# Patient Record
Sex: FEMALE | URBAN - METROPOLITAN AREA
[De-identification: names, ages, dates, MRNs, and addresses within clinical notes are randomized per-mention and may not be internally consistent; named-entity substitution may affect disease eponyms.]

---

## 2024-09-20 ENCOUNTER — HOSPITAL ENCOUNTER (OUTPATIENT)
Dept: LAB | Age: 54
Setting detail: SPECIMEN
Discharge: HOME | End: 2024-09-23

## 2024-09-20 PROCEDURE — 86787 VARICELLA-ZOSTER ANTIBODY: CPT

## 2024-09-20 PROCEDURE — 86765 RUBEOLA ANTIBODY: CPT

## 2024-09-20 PROCEDURE — 86762 RUBELLA ANTIBODY: CPT

## 2024-09-20 PROCEDURE — 36415 COLL VENOUS BLD VENIPUNCTURE: CPT

## 2024-09-20 PROCEDURE — 86735 MUMPS ANTIBODY: CPT

## 2024-11-04 ENCOUNTER — OFFICE VISIT (OUTPATIENT)
Dept: FAMILY MEDICINE CLINIC | Facility: CLINIC | Age: 54
End: 2024-11-04

## 2024-11-04 VITALS
BODY MASS INDEX: 38.04 KG/M2 | TEMPERATURE: 98 F | HEART RATE: 100 BPM | DIASTOLIC BLOOD PRESSURE: 100 MMHG | HEIGHT: 64 IN | SYSTOLIC BLOOD PRESSURE: 140 MMHG | OXYGEN SATURATION: 97 % | WEIGHT: 222.8 LBS

## 2024-11-04 DIAGNOSIS — Z13.6 SCREENING FOR HEART DISEASE: ICD-10-CM

## 2024-11-04 DIAGNOSIS — Z12.39 ENCOUNTER FOR SCREENING BREAST EXAMINATION AND DISCUSSION OF BREAST SELF EXAMINATION: ICD-10-CM

## 2024-11-04 DIAGNOSIS — Z13.29 SCREENING FOR ENDOCRINE DISORDER: Primary | ICD-10-CM

## 2024-11-04 DIAGNOSIS — E11.9 TYPE 2 DIABETES MELLITUS WITHOUT COMPLICATION, WITHOUT LONG-TERM CURRENT USE OF INSULIN (HCC): ICD-10-CM

## 2024-11-04 DIAGNOSIS — Z80.3 FAMILY HISTORY OF BREAST CANCER IN FEMALE: ICD-10-CM

## 2024-11-04 DIAGNOSIS — E78.2 MIXED HYPERLIPIDEMIA: ICD-10-CM

## 2024-11-04 DIAGNOSIS — I10 PRIMARY HYPERTENSION: ICD-10-CM

## 2024-11-04 LAB
ALBUMIN SERPL-MCNC: 3.7 G/DL (ref 3.5–5)
ALBUMIN, URINE, POC: 150 MG/L
ALBUMIN/GLOB SERPL: 0.9 (ref 1–1.9)
ALP SERPL-CCNC: 158 U/L (ref 35–104)
ALT SERPL-CCNC: 24 U/L (ref 8–45)
ANION GAP SERPL CALC-SCNC: 8 MMOL/L (ref 7–16)
AST SERPL-CCNC: 30 U/L (ref 15–37)
BASOPHILS # BLD: 0 K/UL (ref 0–0.2)
BASOPHILS NFR BLD: 1 % (ref 0–2)
BILIRUB SERPL-MCNC: 0.4 MG/DL (ref 0–1.2)
BILIRUBIN, URINE, POC: ABNORMAL
BLOOD URINE, POC: NEGATIVE
BUN SERPL-MCNC: 16 MG/DL (ref 6–23)
CALCIUM SERPL-MCNC: 9.8 MG/DL (ref 8.8–10.2)
CHLORIDE SERPL-SCNC: 105 MMOL/L (ref 98–107)
CHOLEST SERPL-MCNC: 221 MG/DL (ref 0–200)
CO2 SERPL-SCNC: 30 MMOL/L (ref 20–29)
CREAT SERPL-MCNC: 0.84 MG/DL (ref 0.6–1.1)
CREATININE, URINE, POC: 300 MG/DL
DIFFERENTIAL METHOD BLD: ABNORMAL
EOSINOPHIL # BLD: 0.1 K/UL (ref 0–0.8)
EOSINOPHIL NFR BLD: 2 % (ref 0.5–7.8)
ERYTHROCYTE [DISTWIDTH] IN BLOOD BY AUTOMATED COUNT: 15 % (ref 11.9–14.6)
EST. AVERAGE GLUCOSE BLD GHB EST-MCNC: 149 MG/DL
GLOBULIN SER CALC-MCNC: 4.3 G/DL (ref 2.3–3.5)
GLUCOSE SERPL-MCNC: 91 MG/DL (ref 70–99)
GLUCOSE URINE, POC: ABNORMAL
HBA1C MFR BLD: 6.8 % (ref 0–5.6)
HCT VFR BLD AUTO: 45.7 % (ref 35.8–46.3)
HDLC SERPL-MCNC: 96 MG/DL (ref 40–60)
HDLC SERPL: 2.3 (ref 0–5)
HGB BLD-MCNC: 14.3 G/DL (ref 11.7–15.4)
IMM GRANULOCYTES # BLD AUTO: 0 K/UL (ref 0–0.5)
IMM GRANULOCYTES NFR BLD AUTO: 0 % (ref 0–5)
KETONES, URINE, POC: ABNORMAL
LDLC SERPL CALC-MCNC: 107 MG/DL (ref 0–100)
LEUKOCYTE ESTERASE, URINE, POC: ABNORMAL
LYMPHOCYTES # BLD: 2.2 K/UL (ref 0.5–4.6)
LYMPHOCYTES NFR BLD: 40 % (ref 13–44)
MCH RBC QN AUTO: 25.9 PG (ref 26.1–32.9)
MCHC RBC AUTO-ENTMCNC: 31.3 G/DL (ref 31.4–35)
MCV RBC AUTO: 82.6 FL (ref 82–102)
MICROALB/CREAT RATIO, POC: ABNORMAL MG/G
MONOCYTES # BLD: 0.6 K/UL (ref 0.1–1.3)
MONOCYTES NFR BLD: 11 % (ref 4–12)
NEUTS SEG # BLD: 2.6 K/UL (ref 1.7–8.2)
NEUTS SEG NFR BLD: 46 % (ref 43–78)
NITRITE, URINE, POC: NEGATIVE
NRBC # BLD: 0 K/UL (ref 0–0.2)
PH, URINE, POC: 6.5 (ref 4.6–8)
PLATELET # BLD AUTO: 238 K/UL (ref 150–450)
PMV BLD AUTO: 11.6 FL (ref 9.4–12.3)
POTASSIUM SERPL-SCNC: 4.8 MMOL/L (ref 3.5–5.1)
PROT SERPL-MCNC: 8 G/DL (ref 6.3–8.2)
PROTEIN,URINE, POC: 100
RBC # BLD AUTO: 5.53 M/UL (ref 4.05–5.2)
SODIUM SERPL-SCNC: 143 MMOL/L (ref 136–145)
SPECIFIC GRAVITY, URINE, POC: 1.02 (ref 1–1.03)
TRIGL SERPL-MCNC: 91 MG/DL (ref 0–150)
TSH W FREE THYROID IF ABNORMAL: 1.9 UIU/ML (ref 0.27–4.2)
URINALYSIS CLARITY, POC: CLEAR
URINALYSIS COLOR, POC: YELLOW
UROBILINOGEN, POC: NORMAL
VLDLC SERPL CALC-MCNC: 18 MG/DL (ref 6–23)
WBC # BLD AUTO: 5.5 K/UL (ref 4.3–11.1)

## 2024-11-04 RX ORDER — VALACYCLOVIR HYDROCHLORIDE 500 MG/1
500 TABLET, FILM COATED ORAL 2 TIMES DAILY
COMMUNITY

## 2024-11-04 RX ORDER — LOSARTAN POTASSIUM 25 MG/1
25 TABLET ORAL
Qty: 90 TABLET | Refills: 1 | Status: SHIPPED | OUTPATIENT
Start: 2024-11-04

## 2024-11-04 RX ORDER — MELOXICAM 15 MG/1
15 TABLET ORAL DAILY
COMMUNITY
End: 2024-11-04 | Stop reason: SDUPTHER

## 2024-11-04 RX ORDER — AMLODIPINE BESYLATE 10 MG/1
10 TABLET ORAL DAILY
COMMUNITY
End: 2024-11-04 | Stop reason: SDUPTHER

## 2024-11-04 RX ORDER — ROSUVASTATIN CALCIUM 20 MG/1
20 TABLET, COATED ORAL DAILY
COMMUNITY
End: 2024-11-04 | Stop reason: SDUPTHER

## 2024-11-04 RX ORDER — METFORMIN HYDROCHLORIDE 500 MG/1
500 TABLET, EXTENDED RELEASE ORAL
COMMUNITY
End: 2024-11-04 | Stop reason: SDUPTHER

## 2024-11-04 RX ORDER — PANTOPRAZOLE SODIUM 40 MG/1
40 TABLET, DELAYED RELEASE ORAL DAILY
COMMUNITY
End: 2024-11-04 | Stop reason: SDUPTHER

## 2024-11-04 RX ORDER — PANTOPRAZOLE SODIUM 40 MG/1
40 TABLET, DELAYED RELEASE ORAL DAILY
Qty: 90 TABLET | Refills: 1 | Status: SHIPPED | OUTPATIENT
Start: 2024-11-04

## 2024-11-04 RX ORDER — ROSUVASTATIN CALCIUM 20 MG/1
20 TABLET, COATED ORAL DAILY
Qty: 90 TABLET | Refills: 1 | Status: SHIPPED | OUTPATIENT
Start: 2024-11-04

## 2024-11-04 RX ORDER — METFORMIN HYDROCHLORIDE 500 MG/1
500 TABLET, EXTENDED RELEASE ORAL
Qty: 30 TABLET | Refills: 2 | Status: SHIPPED | OUTPATIENT
Start: 2024-11-04

## 2024-11-04 RX ORDER — MELOXICAM 15 MG/1
15 TABLET ORAL DAILY
Qty: 90 TABLET | Refills: 1 | Status: SHIPPED | OUTPATIENT
Start: 2024-11-04

## 2024-11-04 RX ORDER — AMLODIPINE BESYLATE 10 MG/1
10 TABLET ORAL DAILY
Qty: 90 TABLET | Refills: 1 | Status: SHIPPED | OUTPATIENT
Start: 2024-11-04

## 2024-11-04 SDOH — ECONOMIC STABILITY: FOOD INSECURITY: WITHIN THE PAST 12 MONTHS, YOU WORRIED THAT YOUR FOOD WOULD RUN OUT BEFORE YOU GOT MONEY TO BUY MORE.: NEVER TRUE

## 2024-11-04 SDOH — ECONOMIC STABILITY: FOOD INSECURITY: WITHIN THE PAST 12 MONTHS, THE FOOD YOU BOUGHT JUST DIDN'T LAST AND YOU DIDN'T HAVE MONEY TO GET MORE.: NEVER TRUE

## 2024-11-04 SDOH — ECONOMIC STABILITY: INCOME INSECURITY: HOW HARD IS IT FOR YOU TO PAY FOR THE VERY BASICS LIKE FOOD, HOUSING, MEDICAL CARE, AND HEATING?: NOT VERY HARD

## 2024-11-04 ASSESSMENT — PATIENT HEALTH QUESTIONNAIRE - PHQ9
SUM OF ALL RESPONSES TO PHQ9 QUESTIONS 1 & 2: 0
2. FEELING DOWN, DEPRESSED OR HOPELESS: NOT AT ALL
SUM OF ALL RESPONSES TO PHQ QUESTIONS 1-9: 0
1. LITTLE INTEREST OR PLEASURE IN DOING THINGS: NOT AT ALL
SUM OF ALL RESPONSES TO PHQ QUESTIONS 1-9: 0

## 2024-11-04 NOTE — PROGRESS NOTES
Metabolic Panel    Mixed hyperlipidemia  -     Lipid Panel; Future  -     Lipid Panel    Primary hypertension    Family history of breast cancer in female  Comments:  mother had DCIS around 63    Encounter for screening breast examination and discussion of breast self examination  -     [facility] DIGITAL SCREEN W OR WO CAD BILATERAL; Future    Other orders  -     losartan (COZAAR) 25 MG tablet; Take 1 tablet by mouth nightly  -     rosuvastatin (CRESTOR) 20 MG tablet; Take 1 tablet by mouth daily  -     pantoprazole (PROTONIX) 40 MG tablet; Take 1 tablet by mouth daily  -     amLODIPine (NORVASC) 10 MG tablet; Take 1 tablet by mouth daily  -     meloxicam (MOBIC) 15 MG tablet; Take 1 tablet by mouth daily  -     metFORMIN (GLUCOPHAGE-XR) 500 MG extended release tablet; Take 1 tablet by mouth nightly      Plan includes the following:  Follow up with labs in 6 months if remaining stable   No follow-up provider specified.  Orders Placed This Encounter   Procedures    IAN DIGITAL SCREEN W OR WO CAD BILATERAL     Standing Status:   Future     Standing Expiration Date:   1/4/2026    CBC with Auto Differential     Standing Status:   Future     Number of Occurrences:   1     Standing Expiration Date:   11/4/2025    TSH with Reflex     Standing Status:   Future     Number of Occurrences:   1     Standing Expiration Date:   11/4/2025    Comprehensive Metabolic Panel     Standing Status:   Future     Number of Occurrences:   1     Standing Expiration Date:   11/4/2025    Lipid Panel     Standing Status:   Future     Number of Occurrences:   1     Standing Expiration Date:   11/4/2025    Hemoglobin A1C     Standing Status:   Future     Number of Occurrences:   1     Standing Expiration Date:   11/4/2025    [facility] - [facility]     Referral Priority:   Routine     Referral Type:   Eval and Treat     Referral Reason:   Specialty Services Required     Referral Location:   [facility]

## 2024-12-04 ENCOUNTER — HOSPITAL ENCOUNTER (EMERGENCY)
Age: 54
Discharge: HOME | End: 2024-12-04
Attending: EMERGENCY MEDICINE
Payer: COMMERCIAL

## 2024-12-04 ENCOUNTER — APPOINTMENT (OUTPATIENT)
Dept: GENERAL RADIOLOGY | Age: 54
End: 2024-12-04
Payer: COMMERCIAL

## 2024-12-04 VITALS
OXYGEN SATURATION: 98 % | DIASTOLIC BLOOD PRESSURE: 110 MMHG | BODY MASS INDEX: 37.56 KG/M2 | RESPIRATION RATE: 18 BRPM | WEIGHT: 220 LBS | TEMPERATURE: 97.5 F | SYSTOLIC BLOOD PRESSURE: 148 MMHG | HEIGHT: 64 IN | HEART RATE: 80 BPM

## 2024-12-04 DIAGNOSIS — S92.354A NONDISPLACED FRACTURE OF FIFTH METATARSAL BONE, RIGHT FOOT, INITIAL ENCOUNTER FOR CLOSED FRACTURE: Primary | ICD-10-CM

## 2024-12-04 PROCEDURE — 73630 X-RAY EXAM OF FOOT: CPT

## 2024-12-04 PROCEDURE — 99283 EMERGENCY DEPT VISIT LOW MDM: CPT

## 2024-12-04 RX ORDER — HYDROCODONE BITARTRATE AND ACETAMINOPHEN 5; 325 MG/1; MG/1
1 TABLET ORAL EVERY 6 HOURS PRN
Qty: 12 TABLET | Refills: 0 | Status: SHIPPED | OUTPATIENT
Start: 2024-12-04 | End: 2024-12-07

## 2024-12-04 ASSESSMENT — PAIN SCALES - GENERAL
PAINLEVEL_OUTOF10: 9
PAINLEVEL_OUTOF10: 3

## 2024-12-04 ASSESSMENT — PAIN - FUNCTIONAL ASSESSMENT
PAIN_FUNCTIONAL_ASSESSMENT: 0-10
PAIN_FUNCTIONAL_ASSESSMENT: 0-10

## 2024-12-04 ASSESSMENT — LIFESTYLE VARIABLES
HOW MANY STANDARD DRINKS CONTAINING ALCOHOL DO YOU HAVE ON A TYPICAL DAY: PATIENT DOES NOT DRINK
HOW OFTEN DO YOU HAVE A DRINK CONTAINING ALCOHOL: NEVER

## 2024-12-04 NOTE — DISCHARGE INSTRUCTIONS
Take medication as prescribed.   Wear walking boot.  Follow up with orthopedics. A referral has been placed to assist you with follow up.

## 2024-12-04 NOTE — ED TRIAGE NOTES
Pt ambulatory with c/o right foot pain X3 weeks causing difficulty ambulating. Pt denies any injury and states the XR she had at Urgent Care was negative.

## 2024-12-04 NOTE — ED PROVIDER NOTES
fracture through the  proximal aspect of the fifth metatarsal bone. No other acute findings seen.    Electronically signed by Binh Miramontes        XR FOOT RIGHT (MIN 3 VIEWS)   Final Result   Findings/impression: There is an acute nondisplaced fracture through the   proximal aspect of the fifth metatarsal bone. No other acute findings seen.      Electronically signed by Binh Miramontes                          Voice dictation software was used during the making of this note.  This software is not perfect and grammatical and other typographical errors may be present.  This note has not been completely proofread for errors.     Jalil Villafuerte MD  12/04/24 [id number]

## 2024-12-05 ENCOUNTER — TELEPHONE (OUTPATIENT)
Dept: ORTHOPEDIC SURGERY | Age: 54
End: 2024-12-05

## 2024-12-06 ENCOUNTER — OFFICE VISIT (OUTPATIENT)
Dept: ORTHOPEDIC SURGERY | Age: 54
End: 2024-12-06
Payer: COMMERCIAL

## 2024-12-06 ENCOUNTER — HOSPITAL ENCOUNTER (OUTPATIENT)
Dept: MAMMOGRAPHY | Age: 54
Discharge: HOME | End: 2024-12-09
Payer: COMMERCIAL

## 2024-12-06 VITALS — HEIGHT: 64 IN | WEIGHT: 220 LBS | BODY MASS INDEX: 37.56 KG/M2

## 2024-12-06 DIAGNOSIS — S92.354A CLOSED NONDISPLACED FRACTURE OF FIFTH METATARSAL BONE OF RIGHT FOOT, INITIAL ENCOUNTER: Primary | ICD-10-CM

## 2024-12-06 DIAGNOSIS — Z13.29 SCREENING FOR ENDOCRINE DISORDER: ICD-10-CM

## 2024-12-06 DIAGNOSIS — R52 PAIN: ICD-10-CM

## 2024-12-06 DIAGNOSIS — Z12.39 ENCOUNTER FOR SCREENING BREAST EXAMINATION AND DISCUSSION OF BREAST SELF EXAMINATION: ICD-10-CM

## 2024-12-06 PROCEDURE — 77063 BREAST TOMOSYNTHESIS BI: CPT

## 2024-12-06 PROCEDURE — 99204 OFFICE O/P NEW MOD 45 MIN: CPT | Performed by: PHYSICIAN ASSISTANT

## 2024-12-06 RX ORDER — ERGOCALCIFEROL 1.25 MG/1
50000 CAPSULE, LIQUID FILLED ORAL WEEKLY
Qty: 8 CAPSULE | Refills: 0 | Status: SHIPPED | OUTPATIENT
Start: 2024-12-06

## 2024-12-06 NOTE — PROGRESS NOTES
Name: Eliana Hassan  YOB: 1970  Gender: female  MRN: [id number]    Summary: Zone 2 base 5th Metatarsal fracture:     Surgical and nonsurgical options were discussed in detail with the patient today.  Patient has elected to proceed with nonoperative management    She will stay in a short walking boot.  May WBAT in the boot  Vitamin D 50,000 unit/weekly x 8 weeks  Bone stimulator ordered today.  Due to the high nonunion rates of this fracture, the bone stimulator is imperative in aiding her recovery and prevent surgery.      F/u 4 weeks w/ Foot Xray       CC: lateral foot pain    Eliana Hassan is a 54-year-old female that presents with right lateral foot pain.  Patient states that her pain started approximately 3 weeks ago on the lateral aspect of her foot.  She does not recall any injury or trauma to this foot.  She had gone to an urgent care and x-rays were negative.  She continues to have pain and went to the ED on 12//24 and was diagnosed with 1/5 metatarsal base fracture.  She does not have any pain at rest but has significant pain and difficulty with ambulation.    ROS/Meds/PSH/PMH/FH/SH: below is tobacco and diabetes.  A full history is at the bottom of the note.     Patient Denies fever/chills, headache, visual changes, chest pain, shortness of breath, and nausea/vomiting/diarrhea       Physical Examination:    right Lower Extremity: 2+ DP pulse.  Ecchymosis and edema over the lateral foot. TTP at the 5th MT.  No signs of open injury or laceration.  TTP around the lateral ankle too, however must the pain is over the lateral foot at the 5th metatarsal. ROM ankle is normal, however any foot motion is painful.   Neutral hindfoot alignment.  No cavovarus nor planovalgus foot deformity    Neuro:  normal SILT to s/s/sp/dp/t.  Reflexes normal: 1+ patella reflex bilaterally, 1+ achilles reflex bilaterally, negative babinski bilaterally. no signs of hyper reflexia or absent reflex    Vascular: BLE:

## 2024-12-16 ENCOUNTER — TELEPHONE (OUTPATIENT)
Dept: FAMILY MEDICINE CLINIC | Facility: CLINIC | Age: 54
End: 2024-12-16

## 2024-12-16 NOTE — TELEPHONE ENCOUNTER
Pt request pharmacy to be changed to [facility] pharmacy    [id number] - [place], [place] - [address] [facility] [phone] - F [phone]

## 2024-12-24 ENCOUNTER — TELEPHONE (OUTPATIENT)
Dept: FAMILY MEDICINE CLINIC | Facility: CLINIC | Age: 54
End: 2024-12-24

## 2024-12-24 NOTE — TELEPHONE ENCOUNTER
Patient priscilla requesting refills for the following medications:          amLODIPine (NORVASC) 10 MG     pantoprazole (PROTONIX) 40 MG      metFORMIN (GLUCOPHAGE-XR) 500 MG extended release tablet    rosuvastatin (CRESTOR) 20 MG tablet [[id number]]       [id number] [address] [phone] -  [phone]

## 2024-12-27 RX ORDER — ROSUVASTATIN CALCIUM 20 MG/1
20 TABLET, COATED ORAL DAILY
Qty: 90 TABLET | Refills: 1 | Status: SHIPPED | OUTPATIENT
Start: 2024-12-27

## 2024-12-27 RX ORDER — METFORMIN HYDROCHLORIDE 500 MG/1
500 TABLET, EXTENDED RELEASE ORAL
Qty: 30 TABLET | Refills: 2 | Status: SHIPPED | OUTPATIENT
Start: 2024-12-27

## 2024-12-27 RX ORDER — AMLODIPINE BESYLATE 10 MG/1
10 TABLET ORAL DAILY
Qty: 90 TABLET | Refills: 1 | Status: SHIPPED | OUTPATIENT
Start: 2024-12-27

## 2024-12-27 RX ORDER — PANTOPRAZOLE SODIUM 40 MG/1
40 TABLET, DELAYED RELEASE ORAL DAILY
Qty: 90 TABLET | Refills: 1 | Status: SHIPPED | OUTPATIENT
Start: 2024-12-27

## 2025-01-09 ENCOUNTER — TELEPHONE (OUTPATIENT)
Dept: ORTHOPEDIC SURGERY | Age: 55
End: 2025-01-09

## 2025-01-09 ENCOUNTER — OFFICE VISIT (OUTPATIENT)
Dept: ORTHOPEDIC SURGERY | Age: 55
End: 2025-01-09
Payer: COMMERCIAL

## 2025-01-09 DIAGNOSIS — S92.354A CLOSED NONDISPLACED FRACTURE OF FIFTH METATARSAL BONE OF RIGHT FOOT, INITIAL ENCOUNTER: ICD-10-CM

## 2025-01-09 DIAGNOSIS — R52 PAIN: Primary | ICD-10-CM

## 2025-01-09 PROCEDURE — M5016 MISC CARBON FIBER: HCPCS | Performed by: PHYSICIAN ASSISTANT

## 2025-01-09 PROCEDURE — 99213 OFFICE O/P EST LOW 20 MIN: CPT | Performed by: PHYSICIAN ASSISTANT

## 2025-01-09 NOTE — PROGRESS NOTES
Name: Eliana Hassan  YOB: 1970  Gender: female  MRN: [id number]    Summary: Zone 2 base 5th Metatarsal fracture:     Increase WB in the boot and transition to carbon fiber insert.  Carbon fiber insert given today.  Continue Vitamin D 50,000 unit/weekly   Bone stimulator ordered today.  Due to the high nonunion rates of this fracture, the bone stimulator is imperative in aiding her recovery and prevent surgery.    Work note: 1 hour sitting for every 1 hour standing.      F/u 4-6 weeks w/ Foot Xray       CC: lateral foot pain    Eliana Hassan is a 54-year-old female that presents with right lateral foot pain.  Patient states that her pain started approximately 3 weeks ago on the lateral aspect of her foot.  She does not recall any injury or trauma to this foot.  She had gone to an urgent care and x-rays were negative.  She continues to have pain and went to the ED on 12//24 and was diagnosed with 1/5 metatarsal base fracture.  She does not have any pain at rest but has significant pain and difficulty with ambulation.    1/9/2025-patient states she is doing well.  She has been weightbearing some in the boot.  She is back to work but doing sitting work only.  She states that her pain has improved but does still have some pain.  She states that she did not get a call about the bone stimulator.    ROS/Meds/PSH/PMH/FH/SH: below is tobacco and diabetes.  A full history is at the bottom of the note.     Patient Denies fever/chills, headache, visual changes, chest pain, shortness of breath, and nausea/vomiting/diarrhea       Physical Examination:    right Lower Extremity: 2+ DP pulse.  Ecchymosis and edema over the lateral foot. TTP at the 5th MT.  No signs of open injury or laceration.  TTP around the lateral ankle too, however must the pain is over the lateral foot at the 5th metatarsal. ROM ankle is normal, however any foot motion is painful.   Neutral hindfoot alignment.  No cavovarus nor planovalgus

## 2025-01-09 NOTE — TELEPHONE ENCOUNTER
Eliana Hassan  Female, 54 y.o., 1970  MRN: [id number]  Phone: [phone]  Code: Not on file (no ACP docs)

## 2025-01-09 NOTE — PROGRESS NOTES
Patient was fitted and instructed on a Carbon Fiber Insert for the right foot. Patient read and signed documenting they understand and agree to [facility]'s current DME return policy.

## 2025-01-29 ENCOUNTER — TELEPHONE (OUTPATIENT)
Dept: FAMILY MEDICINE CLINIC | Facility: CLINIC | Age: 55
End: 2025-01-29

## 2025-01-29 RX ORDER — MELOXICAM 15 MG/1
15 TABLET ORAL DAILY
Qty: 90 TABLET | Refills: 1 | Status: SHIPPED | OUTPATIENT
Start: 2025-01-29

## 2025-01-29 NOTE — TELEPHONE ENCOUNTER
Pt called and request a refill for this medication:        meloxicam (MOBIC) 15 MG tablet       [id number] - [place], [place] - [address] [place] [address] -  [phone] -  [phone]

## 2025-02-19 ENCOUNTER — OFFICE VISIT (OUTPATIENT)
Dept: ORTHOPEDIC SURGERY | Age: 55
End: 2025-02-19
Payer: COMMERCIAL

## 2025-02-19 DIAGNOSIS — R52 PAIN: Primary | ICD-10-CM

## 2025-02-19 DIAGNOSIS — S92.354G CLOSED NONDISPLACED FRACTURE OF FIFTH METATARSAL BONE OF RIGHT FOOT WITH DELAYED HEALING, SUBSEQUENT ENCOUNTER: ICD-10-CM

## 2025-02-19 PROCEDURE — 99213 OFFICE O/P EST LOW 20 MIN: CPT | Performed by: PHYSICIAN ASSISTANT

## 2025-02-19 NOTE — PROGRESS NOTES
Name: Eliana Hassan  YOB: 1970  Gender: female  MRN: [id number]    Summary: Zone 2 base 5th Metatarsal fracture:     Continue bone stimulator and vitamin D prescription  Continue carbon fiber insert    Work note: 1 hour sitting for every 1 hour standing.      F/u 8 weeks w/ Foot Xray       CC: lateral foot pain    Eliana Hassan is a 54-year-old female that presents with right lateral foot pain.  Patient states that her pain started approximately 3 weeks ago on the lateral aspect of her foot.  She does not recall any injury or trauma to this foot.  She had gone to an urgent care and x-rays were negative.  She continues to have pain and went to the ED on 12//24 and was diagnosed with 1/5 metatarsal base fracture.  She does not have any pain at rest but has significant pain and difficulty with ambulation.    1/9/2025-patient states she is doing well.  She has been weightbearing some in the boot.  She is back to work but doing sitting work only.  She states that her pain has improved but does still have some pain.  She states that she did not get a call about the bone stimulator.    2/19/2025-patient states she is doing well.  She states that she has good days and bad days.  She will note swelling and pain when she is up on it for longer periods of time and has done more that day.  She is in regular shoes with carbon fiber insert and is handling that well.  She states she got the bone stimulator about a month ago and has been using that daily.    ROS/Meds/PSH/PMH/FH/SH: below is tobacco and diabetes.  A full history is at the bottom of the note.     Patient Denies fever/chills, headache, visual changes, chest pain, shortness of breath, and nausea/vomiting/diarrhea       Physical Examination:    right Lower Extremity: 2+ DP pulse.  No ecchymosis and edema over the lateral foot. TTP at the 5th MT.  No signs of open injury or laceration.  No TTP around the lateral ankle. ROM ankle is normal, however

## 2025-04-04 NOTE — ED NOTES
Monitor: The problem is stable.  Evaluation: Reviewed recent labs/tests with the patient.  Assessment/Treatment:  Follow up in 6 months   Patient mobility status  with no difficulty.     I have reviewed discharge instructions with the patient.  The patient verbalized understanding.    Patient left ED via Discharge Method: ambulatory to Home with self.    Opportunity for questions and clarification provided.     Patient given 1 scripts.

## 2025-04-16 ENCOUNTER — OFFICE VISIT (OUTPATIENT)
Dept: ORTHOPEDIC SURGERY | Age: 55
End: 2025-04-16
Payer: COMMERCIAL

## 2025-04-16 DIAGNOSIS — R52 PAIN: Primary | ICD-10-CM

## 2025-04-16 DIAGNOSIS — S92.354G CLOSED NONDISPLACED FRACTURE OF FIFTH METATARSAL BONE OF RIGHT FOOT WITH DELAYED HEALING, SUBSEQUENT ENCOUNTER: ICD-10-CM

## 2025-04-16 PROCEDURE — 99213 OFFICE O/P EST LOW 20 MIN: CPT | Performed by: PHYSICIAN ASSISTANT

## 2025-04-16 NOTE — PROGRESS NOTES
Name: Eliana Hassan  YOB: 1970  Gender: female  MRN: [id number]    Summary: Zone 2 base 5th Metatarsal fracture:     Continue bone stimulator and vitamin D prescription  Wean out of carbon fiber insert as tolerated      F/u 8 weeks w/ Foot Xray       CC: lateral foot pain    Eliana Hassan is a 54-year-old female that presents with right lateral foot pain.  Patient states that her pain started approximately 3 weeks ago on the lateral aspect of her foot.  She does not recall any injury or trauma to this foot.  She had gone to an urgent care and x-rays were negative.  She continues to have pain and went to the ED on 12//24 and was diagnosed with 1/5 metatarsal base fracture.  She does not have any pain at rest but has significant pain and difficulty with ambulation.    1/9/2025-patient states she is doing well.  She has been weightbearing some in the boot.  She is back to work but doing sitting work only.  She states that her pain has improved but does still have some pain.  She states that she did not get a call about the bone stimulator.    2/19/2025-patient states she is doing well.  She states that she has good days and bad days.  She will note swelling and pain when she is up on it for longer periods of time and has done more that day.  She is in regular shoes with carbon fiber insert and is handling that well.  She states she got the bone stimulator about a month ago and has been using that daily.    4/16/2025-patient states that she is doing better.  She still has moments of pain with certain movements of her foot.  She is wearing the carbon fiber insert and using the bone stimulator daily.  She states that she is definitely walking more normal and can tell that it is healing.    ROS/Meds/PSH/PMH/FH/SH: below is tobacco and diabetes.  A full history is at the bottom of the note.     Patient Denies fever/chills, headache, visual changes, chest pain, shortness of breath, and

## 2025-06-30 ENCOUNTER — TELEPHONE (OUTPATIENT)
Dept: FAMILY MEDICINE CLINIC | Facility: CLINIC | Age: 55
End: 2025-06-30

## 2025-06-30 NOTE — TELEPHONE ENCOUNTER
Pt need a refill for the following med:        valACYclovir (VALTREX) 500 MG tablet [[id number]    [id number] - [place], [place] - [address] [facility] [place] -  [phone] -  [phone]

## 2025-07-02 ENCOUNTER — OFFICE VISIT (OUTPATIENT)
Dept: ORTHOPEDIC SURGERY | Age: 55
End: 2025-07-02
Payer: COMMERCIAL

## 2025-07-02 DIAGNOSIS — S92.354G CLOSED NONDISPLACED FRACTURE OF FIFTH METATARSAL BONE OF RIGHT FOOT WITH DELAYED HEALING, SUBSEQUENT ENCOUNTER: Primary | ICD-10-CM

## 2025-07-02 PROCEDURE — 99214 OFFICE O/P EST MOD 30 MIN: CPT | Performed by: PHYSICIAN ASSISTANT

## 2025-07-02 RX ORDER — ERGOCALCIFEROL 1.25 MG/1
50000 CAPSULE, LIQUID FILLED ORAL WEEKLY
Qty: 8 CAPSULE | Refills: 0 | Status: SHIPPED | OUTPATIENT
Start: 2025-07-02 | End: 2025-08-21

## 2025-07-02 NOTE — PROGRESS NOTES
Name: Eliana Hassan  YOB: 1970  Gender: female  MRN: [id number]    Summary: Zone 2 base 5th Metatarsal fracture:     Continue bone stimulator on right foot  Begin using carbon fiber insert on the left foot  New prescription for vitamin D 50,000 units/weekly x 8 weeks      F/u 8 weeks w/ Foot Xray       CC: lateral foot pain    Eliana Hassan is a 54-year-old female that presents with right lateral foot pain.  Patient states that her pain started approximately 3 weeks ago on the lateral aspect of her foot.  She does not recall any injury or trauma to this foot.  She had gone to an urgent care and x-rays were negative.  She continues to have pain and went to the ED on 12//24 and was diagnosed with 1/5 metatarsal base fracture.  She does not have any pain at rest but has significant pain and difficulty with ambulation.    1/9/2025-patient states she is doing well.  She has been weightbearing some in the boot.  She is back to work but doing sitting work only.  She states that her pain has improved but does still have some pain.  She states that she did not get a call about the bone stimulator.    2/19/2025-patient states she is doing well.  She states that she has good days and bad days.  She will note swelling and pain when she is up on it for longer periods of time and has done more that day.  She is in regular shoes with carbon fiber insert and is handling that well.  She states she got the bone stimulator about a month ago and has been using that daily.    4/16/2025-patient states that she is doing better.  She still has moments of pain with certain movements of her foot.  She is wearing the carbon fiber insert and using the bone stimulator daily.  She states that she is definitely walking more normal and can tell that it is healing.    7/2/2025-patient states that she continues to improve in regards to her pain but she still has some soreness and has good days and bad days.  She is using the

## 2025-07-08 ENCOUNTER — TELEPHONE (OUTPATIENT)
Dept: FAMILY MEDICINE CLINIC | Facility: CLINIC | Age: 55
End: 2025-07-08

## 2025-07-08 NOTE — TELEPHONE ENCOUNTER
AMBULATORY CASE MANAGEMENT NOTE    Name and Relationship of Patient/Support Person: Geeta Eladia YOLANDA Friedman  Self    Patient Outreach  ACM completed successful outreach to patient. RN-ACM outreach call made to pt. and explained role of RN-ACM and reason for call. Pt. Agreed to case management. ACM following up with patient on ED visit on 3/28/24 for cellulitis/abscess of right leg and hospital admission on 3/14/24- 3/16/24 for chest pain. Reviewed ED AVS with pt. Education provided. Pt. Reports extremity this morning has no increased redness, no swelling, no red streaks, no drainage and is cool to touch. Pt. obtained antibiotics and all dressing disposables. Pt reports understanding of all discharge orders and dressing changes. Pt denies CP, fever, nor cough. Pt has upcoming PCP follow up on 4/2/24. ACM assessed patients hospital admission (CP). Pt. Reports no further CP episodes. Pt. reports angina diagnosis. Pt reports current average BP is 120/70's and current SATS at 96%. She reports to be taking all medications as directed. RN-ACM emphasized importance of medication adherence. Pt reports daily management of DM. Pt. On low carb /no sugar diet. Pt monitors blood glucose 3-4 times a day. Disease education provided. Reviewed SDOH. She denies any needs. She reports to have good family support. Pt's adult daughter is patients caregiver. ACM educated on all care gaps to be completed with pt understanding and acknowledgement. Pt reports annual wellness exam to be completed via upcoming PCP visit on 4/2/24. Pt will complete mammogram and pap smear. ACM will provide ACP information. No questions per pt. She expresses appreciation for the call. Pt engaged in [facility] services. Care plan created this call. Advised pt to call RN-ACM or [facility] 24 hour nurse line with any needs. Follow up outreach scheduled for 3-4 weeks. ACM provided all contact information for all needs/inquiries.    Adult Patient  Pt need a refill for the following med:    valACYclovir (VALTREX) 500 MG tablet [[id number]]         [id number] [place], [place] - [address] [place] [address] [phone] -  [phone]         Profile  Questions/Answers      Flowsheet Row Most Recent Value   Symptoms/Conditions Managed at Home cardiovascular, diabetes, type 2, skin   Barriers to Managing Health none   Cardiovascular Symptoms/Conditions chest pain, high blood cholesterol, other (see comments)  [angina]   Cardiovascular Management Strategies medication therapy, routine screening, weight management   Cardiovascular Self-Management Outcome 3 (uncertain)   Diabetes Management Strategies blood glucose testing, diet modification, insulin therapy, routine screenings, medication therapy   Frequency of Blood Glucose Testing 3 times/day   Last A1C Result 7.2   Diabetes Self-Management Outcome 3 (uncertain)   Skin Symptoms/Conditions other (see comments)  [cellulitisdx on 3/28/24]   Skin Management Strategies dressing changes, medication therapy   Skin Self-Management Outcome 3 (uncertain)   Barriers to Taking Medication as Prescribed none   Equipment Currently Used at Home bp cuff, glucometer, scales, pulse ox   Primary Source of Support/Comfort child(michelle)   People in Home child(michelle), adult   Family Caregiver if Needed child(michelle), adult   Current Living Arrangements home   Resource/Environmental Concerns none        Adult Wellness Assessment  Questions/Answers      Flowsheet Row Most Recent Value   How often do you have someone help you read facts about your health? never   How often do you have trouble learning about your health because you don't know what the written words mean? never   How confident are you filling out forms by yourself? always        Send Education  Questions/Answers      Flowsheet Row Most Recent Value   Annual Wellness Visit:  Patient Will Schedule   Other Patient Education/Resources  24/7 [facility] Nurse Call Line, Advanced Care Planning   24/7 Nurse Call Line Education Method Verbal   ACP Education Method Verbal   Advanced Directives: Send Materials        SDOH updated and reviewed with the patient during this  program:  Financial Resource Strain: Low Risk  (3/29/2024)    Overall Financial Resource Strain (CARDIA)     Difficulty of Paying Living Expenses: Not hard at all      --     Food Insecurity: No Food Insecurity (3/29/2024)    Hunger Vital Sign     Worried About Running Out of Food in the Last Year: Never true     Ran Out of Food in the Last Year: Never true      --     Transportation Needs: No Transportation Needs (3/29/2024)    PRAPARE - Transportation     Lack of Transportation (Medical): No     Lack of Transportation (Non-Medical): No      --     Utilities: Not At Risk (3/29/2024)    [facility] Utilities     Threatened with loss of utilities: No       Education Documentation  Type 2 Diabetes, taught by Jose Alberto Brown RN at 3/29/2024 11:52 AM.  Learner: Patient  Readiness: Acceptance  Method: Explanation  Response: Verbalizes Understanding    Type 1 Diabetes, taught by Jose Alberto Brown RN at 3/29/2024 11:52 AM.  Learner: Patient  Readiness: Acceptance  Method: Explanation  Response: Verbalizes Understanding    Sick Day Management, taught by Jose Alberto Brown RN at 3/29/2024 11:52 AM.  Learner: Patient  Readiness: Acceptance  Method: Explanation  Response: Verbalizes Understanding    Risks, taught by Jose Alberto Brown RN at 3/29/2024 11:52 AM.  Learner: Patient  Readiness: Acceptance  Method: Explanation  Response: Verbalizes Understanding    Benefits, taught by Jose Alberto Brown RN at 3/29/2024 11:52 AM.  Learner: Patient  Readiness: Acceptance  Method: Explanation  Response: Verbalizes Understanding    Adjustment to Diet/Medications, taught by Jose Alberto Brown RN at 3/29/2024 11:52 AM.  Learner: Patient  Readiness: Acceptance  Method: Explanation  Response: Verbalizes Understanding    Sweeteners, taught by Jose Alberto Brown RN at 3/29/2024 11:52 AM.  Learner: Patient  Readiness: Acceptance  Method: Explanation  Response: Verbalizes Understanding    Follow-Up Care, taught by Jose Alberto Brown RN at 3/29/2024 11:52 AM.  Learner:  Patient  Readiness: Acceptance  Method: Explanation  Response: Verbalizes Understanding    Emotions and Feelings, taught by Jose Alberto Brown RN at 3/29/2024 11:52 AM.  Learner: Patient  Readiness: Acceptance  Method: Explanation  Response: Verbalizes Understanding    Consistent Eating Pattern, taught by Jose Alberto Brown RN at 3/29/2024 11:52 AM.  Learner: Patient  Readiness: Acceptance  Method: Explanation  Response: Verbalizes Understanding    Carbohydrate Counting, taught by Jose Alberto Brown RN at 3/29/2024 11:52 AM.  Learner: Patient  Readiness: Acceptance  Method: Explanation  Response: Verbalizes Understanding    Alcohol, taught by Jose Alberto Brown RN at 3/29/2024 11:52 AM.  Learner: Patient  Readiness: Acceptance  Method: Explanation  Response: Verbalizes Understanding    Monitoring Carbohydrate Intake, taught by Jose Alberto Brown RN at 3/29/2024 11:52 AM.  Learner: Patient  Readiness: Acceptance  Method: Explanation  Response: Verbalizes Understanding    Healthy Food Choices, taught by Jose Alberto Brown RN at 3/29/2024 11:52 AM.  Learner: Patient  Readiness: Acceptance  Method: Explanation  Response: Verbalizes Understanding    Carbohydrate-Containing Foods, taught by Jose Alberto Brown RN at 3/29/2024 11:52 AM.  Learner: Patient  Readiness: Acceptance  Method: Explanation  Response: Verbalizes Understanding    Hypoglycemia Identification and Treatment, taught by Jose Alberto Brown RN at 3/29/2024 11:52 AM.  Learner: Patient  Readiness: Acceptance  Method: Explanation  Response: Verbalizes Understanding    Hyperglycemia Identification and Treatment, taught by Jose Alberto Brown RN at 3/29/2024 11:52 AM.  Learner: Patient  Readiness: Acceptance  Method: Explanation  Response: Verbalizes Understanding    Oral Medication, taught by Jose Alberto Brown RN at 3/29/2024 11:52 AM.  Learner: Patient  Readiness: Acceptance  Method: Explanation  Response: Verbalizes Understanding    Insulin Therapy, taught by Jose Alberto Brown RN at  3/29/2024 11:52 AM.  Learner: Patient  Readiness: Acceptance  Method: Explanation  Response: Verbalizes Understanding    Importance of Glycemic Management, taught by Jose Alberto Brown RN at 3/29/2024 11:52 AM.  Learner: Patient  Readiness: Acceptance  Method: Explanation  Response: Verbalizes Understanding    Blood Glucose Monitor Use, taught by Jose Alberto Brown RN at 3/29/2024 11:52 AM.  Learner: Patient  Readiness: Acceptance  Method: Explanation  Response: Verbalizes Understanding    Blood Glucose Monitoring, taught by Jose Alberto Brown RN at 3/29/2024 11:52 AM.  Learner: Patient  Readiness: Acceptance  Method: Explanation  Response: Verbalizes Understanding    Blood Glucose Goal, taught by Jose Alberto Brown RN at 3/29/2024 11:52 AM.  Learner: Patient  Readiness: Acceptance  Method: Explanation  Response: Verbalizes Understanding    A1C Goal, taught by Jose Alberto Brown RN at 3/29/2024 11:52 AM.  Learner: Patient  Readiness: Acceptance  Method: Explanation  Response: Verbalizes Understanding    Frequency of Testing, taught by Jose Alberto Brown RN at 3/29/2024 11:52 AM.  Learner: Patient  Readiness: Acceptance  Method: Explanation  Response: Verbalizes Understanding    Definition, taught by Jose Alberto Brown RN at 3/29/2024 11:52 AM.  Learner: Patient  Readiness: Acceptance  Method: Explanation  Response: Verbalizes Understanding    Diagnosis Criteria, taught by Jose Alberto Brown RN at 3/29/2024 11:52 AM.  Learner: Patient  Readiness: Acceptance  Method: Explanation  Response: Verbalizes Understanding    Diabetes, Type 2, taught by Jose Alberto Brown RN at 3/29/2024 11:52 AM.  Learner: Patient  Readiness: Acceptance  Method: Explanation  Response: Verbalizes Understanding          Jose Alberto SHIN  Ambulatory Case Management    3/29/2024, 11:52 EDT

## 2025-07-09 RX ORDER — VALACYCLOVIR HYDROCHLORIDE 500 MG/1
500 TABLET, FILM COATED ORAL 2 TIMES DAILY
Qty: 12 TABLET | Refills: 1 | Status: SHIPPED | OUTPATIENT
Start: 2025-07-09

## 2025-08-04 ENCOUNTER — COMMUNITY OUTREACH (OUTPATIENT)
Dept: FAMILY MEDICINE CLINIC | Facility: CLINIC | Age: 55
End: 2025-08-04

## 2025-08-26 ENCOUNTER — OFFICE VISIT (OUTPATIENT)
Dept: ORTHOPEDIC SURGERY | Age: 55
End: 2025-08-26
Payer: COMMERCIAL

## 2025-08-26 DIAGNOSIS — S92.354G CLOSED NONDISPLACED FRACTURE OF FIFTH METATARSAL BONE OF RIGHT FOOT WITH DELAYED HEALING, SUBSEQUENT ENCOUNTER: Primary | ICD-10-CM

## 2025-08-26 PROCEDURE — 99213 OFFICE O/P EST LOW 20 MIN: CPT | Performed by: PHYSICIAN ASSISTANT
